# Patient Record
Sex: FEMALE | Race: WHITE | Employment: OTHER | URBAN - METROPOLITAN AREA
[De-identification: names, ages, dates, MRNs, and addresses within clinical notes are randomized per-mention and may not be internally consistent; named-entity substitution may affect disease eponyms.]

---

## 2022-02-07 ENCOUNTER — HOSPITAL ENCOUNTER (OUTPATIENT)
Dept: CT IMAGING | Age: 67
Discharge: HOME | End: 2022-02-07
Attending: FAMILY MEDICINE
Payer: MEDICARE

## 2022-02-07 ENCOUNTER — TRANSCRIBE ORDER (OUTPATIENT)
Dept: SCHEDULING | Age: 67
End: 2022-02-07

## 2022-02-07 DIAGNOSIS — G43.909 MIGRAINE: Primary | ICD-10-CM

## 2022-02-07 DIAGNOSIS — G43.909 MIGRAINE: ICD-10-CM

## 2022-02-07 PROCEDURE — 70450 CT HEAD/BRAIN W/O DYE: CPT

## 2022-06-30 ENCOUNTER — OFFICE VISIT (OUTPATIENT)
Dept: SLEEP MEDICINE | Age: 67
End: 2022-06-30
Payer: MEDICARE

## 2022-06-30 VITALS
BODY MASS INDEX: 39.49 KG/M2 | HEART RATE: 84 BPM | OXYGEN SATURATION: 96 % | DIASTOLIC BLOOD PRESSURE: 79 MMHG | SYSTOLIC BLOOD PRESSURE: 140 MMHG | HEIGHT: 64 IN | WEIGHT: 231.3 LBS

## 2022-06-30 DIAGNOSIS — Z78.9 INTOLERANCE OF CONTINUOUS POSITIVE AIRWAY PRESSURE (CPAP) VENTILATION: ICD-10-CM

## 2022-06-30 DIAGNOSIS — G47.33 OSA (OBSTRUCTIVE SLEEP APNEA): Primary | ICD-10-CM

## 2022-06-30 DIAGNOSIS — E66.01 SEVERE OBESITY (BMI 35.0-39.9) WITH COMORBIDITY (HCC): ICD-10-CM

## 2022-06-30 PROCEDURE — G8400 PT W/DXA NO RESULTS DOC: HCPCS | Performed by: SPECIALIST

## 2022-06-30 PROCEDURE — G8419 CALC BMI OUT NRM PARAM NOF/U: HCPCS | Performed by: SPECIALIST

## 2022-06-30 PROCEDURE — 1101F PT FALLS ASSESS-DOCD LE1/YR: CPT | Performed by: SPECIALIST

## 2022-06-30 PROCEDURE — G8427 DOCREV CUR MEDS BY ELIG CLIN: HCPCS | Performed by: SPECIALIST

## 2022-06-30 PROCEDURE — 1123F ACP DISCUSS/DSCN MKR DOCD: CPT | Performed by: SPECIALIST

## 2022-06-30 PROCEDURE — 3017F COLORECTAL CA SCREEN DOC REV: CPT | Performed by: SPECIALIST

## 2022-06-30 PROCEDURE — G8536 NO DOC ELDER MAL SCRN: HCPCS | Performed by: SPECIALIST

## 2022-06-30 PROCEDURE — 1090F PRES/ABSN URINE INCON ASSESS: CPT | Performed by: SPECIALIST

## 2022-06-30 PROCEDURE — G8432 DEP SCR NOT DOC, RNG: HCPCS | Performed by: SPECIALIST

## 2022-06-30 PROCEDURE — 99204 OFFICE O/P NEW MOD 45 MIN: CPT | Performed by: SPECIALIST

## 2022-06-30 RX ORDER — LISINOPRIL AND HYDROCHLOROTHIAZIDE 10; 12.5 MG/1; MG/1
TABLET ORAL DAILY
COMMUNITY

## 2022-06-30 RX ORDER — ATORVASTATIN CALCIUM 20 MG/1
TABLET, FILM COATED ORAL DAILY
COMMUNITY

## 2022-06-30 NOTE — PROGRESS NOTES
[address] [place]. [place], [address]  Tel.  [phone]  Fax. [address] [facility] [address]  [place], [address] [facility]  Tel.  [phone]  Fax. [phone] [address] [facility] Erika Cesar   Tel.  [phone]  Fax. [phone]       Chief Complaint       Chief Complaint   Patient presents with    Sleep Problem     NP, switching from PAR       HPI      Leisa Moons is 78 y.o. female seen for evaluation of a sleep disorder. Patient has a history of sleep apnea. Reports initial evaluation at [facility] approximately 3 years ago. Was started on CPAP at 9 cm. Difficulties using CPAP. Currently with tires between 2-3 AM and will get a bed at 10 AM.  Awakens to bed 3 times during the night. During the past 90 days, 90 cm CPAP use during 4 days with average use of 3 hours. Average AHI 3.9. Has been using a nasal mask but describes self as a \"mouth breather\". Continues with excessive daytime sleepiness. May doze if seated and inactive such as when reading, watching TV, public places as movies, riding as a passenger. [place] Sleepiness Score: 13       Allergies   Allergen Reactions    Ambien [Zolpidem] Hives    Bactrim [Sulfamethoprim Ds] Hives    Morphine Other (comments)     hallucination       Current Outpatient Medications   Medication Sig Dispense Refill    atorvastatin (LIPITOR) 20 mg tablet Take  by mouth daily.  lisinopril-hydroCHLOROthiazide (PRINZIDE, ZESTORETIC) 10-12.5 mg per tablet Take  by mouth daily.  CALCIUM CARBONATE (CALCIUM 600 PO) Take 1 Tab by mouth two (2) times a day.  buPROPion XL (WELLBUTRIN XL) 300 mg XL tablet Take 300 mg by mouth daily.  sertraline (ZOLOFT) 100 mg tablet Take 200 mg by mouth daily.  potassium chloride (K-DUR, KLOR-CON) 20 mEq tablet Take 20 mEq by mouth daily.  evening primrose oil (EVENING PRIMROSE) 500 mg cap Take 1 Cap by mouth daily.       oxyCODONE-acetaminophen (PERCOCET) 5-325 mg per tablet 1-2 tabs every 4hrs as needed for pain. Maximum daily dose 12 tablets. (Patient not taking: Reported on 6/30/2022) 80 Tab 0    metoprolol succinate (TOPROL-XL) 25 mg XL tablet Take 25 mg by mouth daily. (Patient not taking: Reported on 6/30/2022)      metFORMIN (GLUCOPHAGE) 500 mg tablet Take  by mouth two (2) times daily (with meals). (Patient not taking: Reported on 6/30/2022)      VITAMIN E, BULK, 400 Int'l Units by Does Not Apply route daily. (Patient not taking: Reported on 6/30/2022)          She  has a past medical history of Arthritis, Asthma, Depression, Diabetes ([address] Fibrocystic breast disease, Clostridium difficile infection (2012), Hypercholesterolemia, Hypertension, Nausea & vomiting, and Psychotic disorder ([address] She  has a past surgical history that includes hx cholecystectomy; hx appendectomy; and hx total vaginal hysterectomy. She family history includes Cancer in her mother; Osteoporosis in her maternal grandmother; Stroke in her father. She  reports that she has never smoked. She has never used smokeless tobacco. She reports that she does not drink alcohol and does not use drugs. Review of Systems:  Review of Systems   HENT: Negative for hearing loss and tinnitus. Eyes: Negative for blurred vision and double vision. Respiratory: Negative for cough and shortness of breath. Cardiovascular: Negative for chest pain and palpitations. Gastrointestinal: Negative for abdominal pain and heartburn. Genitourinary: Negative for dysuria and frequency. Neurological: Positive for headaches. Psychiatric/Behavioral: Positive for depression. The patient is nervous/anxious. Objective:     Visit Vitals  BP (!) 140/79   Pulse 84   Ht 5' 4\" (1.626 m)   Wt 231 lb 4.8 oz (104.9 kg)   SpO2 96%   BMI 39.70 kg/m²     Body mass index is 39.7 kg/m².     General:   Conversant, cooperative   Eyes:   no nystagmus   Oropharynx: tongue normal       Neck:   No carotid bruits; Neck circ. in \"inches\": 18.75   Chest/Lungs:  Clear on auscultation    CVS:  Normal rate, regular rhythm       Neuro:  Speech fluent, face symmetrical, tongue movement normal   Psych:  Normal affect,  normal countenance        Assessment:       ICD-10-CM ICD-9-CM    1. NIK (obstructive sleep apnea)  G47.33 327.23 SLEEP LAB (PAP TITRATION)   2. Severe obesity (BMI 35.0-39. 9) with comorbidity ([address]  E66.01 278.01 SLEEP LAB (PAP TITRATION)   3. Intolerance of continuous positive airway pressure (CPAP) ventilation  Z78.9 V49.89 SLEEP LAB (PAP TITRATION)     History of sleep disordered breathing with poor CPAP tolerance. Records will be obtained from [facility]. BiPAP titration. Plan:     Orders Placed This Encounter    SLEEP LAB (PAP TITRATION)     BIPAP titration study     Standing Status:   Future     Standing Expiration Date:   12/31/2022     Order Specific Question:   Reason for Exam     Answer:   sleep apnea       * Patient has a history and examination consistent with the diagnosis of sleep apnea. *BiPAP sleep testing was ordered  * She was provided information on sleep apnea including corresponding risk factors and the importance of proper treatment. * Treatment options if indicated were reviewed today. Instructions:  o The patient would benefit from weight reduction measures. o Do not engage in activities requiring a normal degree of alertness if fatigue is present. o The patient understands that untreated or undertreated sleep apnea could impair judgement and the ability to function normally during the day.  o Call or return if symptoms worsen or persist.          Eben Haines MD, FAA  Electronically signed 06/30/22       This note was created using voice recognition software. Despite editing, there may be syntax errors. This note will not be viewable in [address]

## 2022-08-22 ENCOUNTER — DOCUMENTATION ONLY (OUTPATIENT)
Dept: SLEEP MEDICINE | Age: 67
End: 2022-08-22

## 2022-09-28 ENCOUNTER — TELEPHONE (OUTPATIENT)
Dept: SLEEP MEDICINE | Age: 67
End: 2022-09-28

## 2022-11-04 ENCOUNTER — TRANSCRIBE ORDER (OUTPATIENT)
Dept: SCHEDULING | Age: 67
End: 2022-11-04

## 2022-11-04 ENCOUNTER — HOSPITAL ENCOUNTER (OUTPATIENT)
Dept: CT IMAGING | Age: 67
Discharge: HOME | End: 2022-11-04
Attending: FAMILY MEDICINE
Payer: MEDICARE

## 2022-11-04 DIAGNOSIS — R10.32 LLQ ABDOMINAL PAIN: ICD-10-CM

## 2022-11-04 DIAGNOSIS — R10.33 UMBILICAL PAIN: Primary | ICD-10-CM

## 2022-11-04 DIAGNOSIS — R10.33 UMBILICAL PAIN: ICD-10-CM

## 2022-11-04 PROCEDURE — 74011000636 HC RX REV CODE- 636: Performed by: FAMILY MEDICINE

## 2022-11-04 PROCEDURE — 74177 CT ABD & PELVIS W/CONTRAST: CPT

## 2022-11-04 PROCEDURE — 82565 ASSAY OF CREATININE: CPT

## 2022-11-04 RX ADMIN — IOPAMIDOL 100 ML: 755 INJECTION, SOLUTION INTRAVENOUS at 17:14

## 2022-11-05 LAB — CREAT BLD-MCNC: 0.9 MG/DL (ref 0.6–1.3)

## 2023-08-28 ENCOUNTER — HOSPITAL ENCOUNTER (OUTPATIENT)
Dept: VASCULAR SURGERY | Facility: HOSPITAL | Age: 68
Discharge: HOME | End: 2023-08-30
Attending: FAMILY MEDICINE
Payer: MEDICARE

## 2023-08-28 DIAGNOSIS — M79.652 LEFT THIGH PAIN: ICD-10-CM

## 2023-08-28 DIAGNOSIS — M79.662 PAIN OF LEFT LOWER LEG: ICD-10-CM

## 2023-08-28 PROCEDURE — 93971 EXTREMITY STUDY: CPT

## 2023-11-16 ENCOUNTER — HOSPITAL ENCOUNTER (OUTPATIENT)
Age: 68
Discharge: HOME | End: 2023-11-16
Payer: MEDICARE

## 2023-11-16 DIAGNOSIS — M54.16 LUMBAR RADICULOPATHY: ICD-10-CM

## 2023-11-16 PROCEDURE — 72148 MRI LUMBAR SPINE W/O DYE: CPT
